# Patient Record
(demographics unavailable — no encounter records)

---

## 2024-12-19 NOTE — HISTORY OF PRESENT ILLNESS
[FreeTextEntry1] : ZANDRA AYON is a 20 year old male  presents for an annual physical.  [de-identified] : NO acute complaints Sees psychiatry for ADD/ depression

## 2024-12-19 NOTE — ASSESSMENT
[FreeTextEntry1] : Blood work was drawn and sent to the lab today. The patient has been instructed to call the office next week to discuss today's lab work.  F/U with psych per routine  continue current meds  Low cholesterol / low fat diet  Increase cardiovascular exercise / weight loss  advised flu vaccine  Annual CPE

## 2024-12-19 NOTE — HEALTH RISK ASSESSMENT
[Excellent] : ~his/her~  mood as  excellent [Yes] : Yes [0] : 2) Feeling down, depressed, or hopeless: Not at all (0) [PHQ-2 Negative - No further assessment needed] : PHQ-2 Negative - No further assessment needed [Never] : Never [[id number]] : 0

## 2025-03-10 NOTE — ASSESSMENT
[FreeTextEntry1] : Pt to go to lab for fasting blood work for CBC/Lipids/ CMP  begin Kenalog cream bid for two weeks for likely allergic rash. F/U if symptoms do not resolve, or if they should change/worsen.

## 2025-03-10 NOTE — HISTORY OF PRESENT ILLNESS
[de-identified] : Pt presents for f/u evaluation of borderline leukocytosis/ hypertriglyceridemia. Last blood test in December was nonfasting and he was fighting a "cold" also c/o of rash around neck for about two weeks - thinks it is related to a silver necklace